# Patient Record
Sex: MALE
[De-identification: names, ages, dates, MRNs, and addresses within clinical notes are randomized per-mention and may not be internally consistent; named-entity substitution may affect disease eponyms.]

---

## 2021-08-03 PROBLEM — Z00.00 ENCOUNTER FOR PREVENTIVE HEALTH EXAMINATION: Status: ACTIVE | Noted: 2021-08-03

## 2021-08-09 ENCOUNTER — APPOINTMENT (OUTPATIENT)
Dept: SURGERY | Facility: CLINIC | Age: 41
End: 2021-08-09
Payer: COMMERCIAL

## 2021-08-09 VITALS
HEIGHT: 75 IN | OXYGEN SATURATION: 99 % | HEART RATE: 64 BPM | DIASTOLIC BLOOD PRESSURE: 86 MMHG | SYSTOLIC BLOOD PRESSURE: 124 MMHG | BODY MASS INDEX: 28.6 KG/M2 | WEIGHT: 230 LBS

## 2021-08-09 VITALS — TEMPERATURE: 96.4 F

## 2021-08-09 DIAGNOSIS — Z78.9 OTHER SPECIFIED HEALTH STATUS: ICD-10-CM

## 2021-08-09 DIAGNOSIS — M62.89 OTHER SPECIFIED DISORDERS OF MUSCLE: ICD-10-CM

## 2021-08-09 DIAGNOSIS — J93.9 PNEUMOTHORAX, UNSPECIFIED: ICD-10-CM

## 2021-08-09 DIAGNOSIS — Z87.891 PERSONAL HISTORY OF NICOTINE DEPENDENCE: ICD-10-CM

## 2021-08-09 PROCEDURE — 99203 OFFICE O/P NEW LOW 30 MIN: CPT

## 2021-08-09 NOTE — PHYSICAL EXAM
[Abdominal Masses] : No abdominal masses [Abdomen Tenderness] : ~T ~M No abdominal tenderness [Alert] : alert [Oriented to Person] : oriented to person [Oriented to Place] : oriented to place [Oriented to Time] : oriented to time [Calm] : calm [de-identified] : He  is alert, well-groomed, and in NAD\par   [de-identified] : anicteric.  Nasal mucosa pink, septum midline. Oral mucosa pink.  Tongue midline, Pharynx without exudates.\par   [de-identified] : Neck supple. Trachea midline. Thyroid isthmus barely palpable, lobes not felt.\par   [de-identified] : no palpable intraabdominal masses

## 2021-08-09 NOTE — HISTORY OF PRESENT ILLNESS
[de-identified] : This is a 41 year  old patient who was referred by Dr. Dex Peterson with the chief complaint of having i abnormal findings on his abdominal MRI. He states that he had  right chest surgery in 2014. Benign. He developed right upper quadrant pain and had an abdominal US that showed  possible fibrosis of the liver. 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    He denies any trauma to the area.   He denies any fever or  night sweats. Appetite is good and weight is stable.

## 2021-08-09 NOTE — PLAN
[FreeTextEntry1] : Mr. FRENCH  was told significance of findings, options, risks and benefits were explained.  IAll surgical options were discussed including non-surgical treatments.   he was advised to return in 6 months and at that time we will order MRI to reassess  the size of the lipoma of the psoas muscle \par Patient advised to seek immediate medical attention with any acute change in symptoms or with the development of any new or worsening symptoms.  Patient's questions and concerns addressed to patient's satisfaction, and patient verbalized an understanding of the information discussed.\par \par

## 2021-08-09 NOTE — CONSULT LETTER
[Dear  ___] : Dear  [unfilled], [Consult Letter:] : I had the pleasure of evaluating your patient, [unfilled]. [Please see my note below.] : Please see my note below. [Consult Closing:] : Thank you very much for allowing me to participate in the care of this patient.  If you have any questions, please do not hesitate to contact me. [Sincerely,] : Sincerely, [FreeTextEntry3] : Randy Cerna MD, FACS

## 2021-11-10 ENCOUNTER — EMERGENCY (EMERGENCY)
Facility: HOSPITAL | Age: 41
LOS: 1 days | Discharge: HOME | End: 2021-11-10
Admitting: EMERGENCY MEDICINE
Payer: OTHER MISCELLANEOUS

## 2021-11-10 VITALS
RESPIRATION RATE: 18 BRPM | TEMPERATURE: 98 F | HEART RATE: 67 BPM | OXYGEN SATURATION: 99 % | SYSTOLIC BLOOD PRESSURE: 122 MMHG | WEIGHT: 175.05 LBS | DIASTOLIC BLOOD PRESSURE: 84 MMHG

## 2021-11-10 VITALS
HEART RATE: 61 BPM | DIASTOLIC BLOOD PRESSURE: 81 MMHG | OXYGEN SATURATION: 97 % | TEMPERATURE: 98 F | SYSTOLIC BLOOD PRESSURE: 133 MMHG | RESPIRATION RATE: 16 BRPM

## 2021-11-10 DIAGNOSIS — S76.311A STRAIN OF MUSCLE, FASCIA AND TENDON OF THE POSTERIOR MUSCLE GROUP AT THIGH LEVEL, RIGHT THIGH, INITIAL ENCOUNTER: ICD-10-CM

## 2021-11-10 DIAGNOSIS — S70.11XA CONTUSION OF RIGHT THIGH, INITIAL ENCOUNTER: ICD-10-CM
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
## 2021-11-10 DIAGNOSIS — M25.462 EFFUSION, LEFT KNEE: ICD-10-CM

## 2021-11-10 DIAGNOSIS — M25.562 PAIN IN LEFT KNEE: ICD-10-CM

## 2021-11-10 DIAGNOSIS — S80.01XA CONTUSION OF RIGHT KNEE, INITIAL ENCOUNTER: ICD-10-CM
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
## 2021-11-10 PROCEDURE — 72170 X-RAY EXAM OF PELVIS: CPT | Mod: 26

## 2021-11-10 PROCEDURE — 73590 X-RAY EXAM OF LOWER LEG: CPT | Mod: 26,LT

## 2021-11-10 PROCEDURE — 73562 X-RAY EXAM OF KNEE 3: CPT | Mod: 26,LT

## 2021-11-10 PROCEDURE — 99284 EMERGENCY DEPT VISIT MOD MDM: CPT | Mod: 25

## 2021-11-10 PROCEDURE — 73564 X-RAY EXAM KNEE 4 OR MORE: CPT | Mod: 26,LT

## 2021-11-10 RX ORDER — KETOROLAC TROMETHAMINE 30 MG/ML
60 SYRINGE (ML) INJECTION ONCE
Refills: 0 | Status: DISCONTINUED | OUTPATIENT
Start: 2021-11-10 | End: 2021-11-10

## 2021-11-10 RX ORDER — OXYCODONE AND ACETAMINOPHEN 5; 325 MG/1; MG/1
1 TABLET ORAL ONCE
Refills: 0 | Status: DISCONTINUED | OUTPATIENT
Start: 2021-11-10 | End: 2021-11-10

## 2021-11-10 RX ADMIN — OXYCODONE AND ACETAMINOPHEN 1 TABLET(S): 5; 325 TABLET ORAL at 21:34

## 2021-11-10 RX ADMIN — Medication 60 MILLIGRAM(S): at 21:34

## 2021-11-10 NOTE — ED PROVIDER NOTE - CHIEF COMPLAINT
The patient is a 41y Male complaining of  The patient is a 41y Male complaining of L knee and R leg pain

## 2021-11-10 NOTE — ED PROVIDER NOTE - CLINICAL SUMMARY MEDICAL DECISION MAKING FREE TEXT BOX
42 yo m pw acute onset of L knee pain aching mod in severity with swelling over the anterior knee and R hamstring pain aching after pt fell off stilts at work with no weakness or numbness. No head or neck trauma, no loc, no ams, no focal deficits. Pt is able to bare weight and ambulate.     +swelling TTP over the L ant prepatellar with bruising, +TTP over the R posterior hamstring, pt able to fully flex and extend the knee b/l with strength 5/5, no c spine ttp -- full ROM flexion; rotation and extension. Full ROM in all joints.    Pt did not want to have acewrap applied and wanted to do it at home, crutches given, and follow up with ortho, pt ambulated well with crutches.

## 2021-11-10 NOTE — ED PROVIDER NOTE - CARE PLAN
1 Principal Discharge DX:	Contusion of knee, right  Secondary Diagnosis:	Effusion of left prepatellar bursa  Secondary Diagnosis:	Hamstring strain

## 2021-11-10 NOTE — ED ADULT NURSE NOTE - OBJECTIVE STATEMENT
41y Male presents w/ left injury from trip and fall at work. Bruising and swelling noted to left knee. ROM intact, pulses intact, sensation intact.

## 2021-11-10 NOTE — ED PROVIDER NOTE - PATIENT PORTAL LINK FT
You can access the FollowMyHealth Patient Portal offered by [facility] by registering at the following website: [email or web address]. By joining [employer]’s FollowMyHealth portal, you will also be able to view your health information using other applications (apps) compatible with our system.

## 2021-11-10 NOTE — ED PROVIDER NOTE - OBJECTIVE STATEMENT
40 yo m pw acute onset of L knee pain aching mod in severity with swelling over the anterior knee and R hamstring pain aching after pt fell off stilts at work with no weakness or numbness. No head or neck trauma, no loc, no ams, no focal deficits. Pt is able to bare weight and ambulate.     I have reviewed available current nursing and previous documentation of past medical, surgical, family, and/or social history.

## 2021-11-10 NOTE — ED PROVIDER NOTE - CARE PROVIDER_API CALL
Anthony Newton)  Orthopaedic Surgery  [address]  [place], [place] [address]  Phone: [phone]  Fax: [phone]  Follow Up Time:

## 2021-11-10 NOTE — ED PROVIDER NOTE - PHYSICAL EXAMINATION
Physical Exam    Vital Signs: I have reviewed the initial vital signs.  Constitutional: well-nourished, appears stated age, no acute distress  Cardiovascular: regular rate, regular rhythm, well-perfused extremities, DP pulse +2 and equal b/l, cap refill <2 sec b/l  Respiratory: unlabored respiratory effort, clear to auscultation bilaterally  Gastrointestinal: soft, non-tender abdomen, no guarding  Musculoskeletal: +swelling TTP over the L ant prepatellar with bruising, +TTP over the R posterior hamstring, pt able to fully flex and extend the knee b/l with strength 5/5, no c spine ttp -- full ROM flexion; rotation and extension. Full ROM in all joints.   Integumentary: warm, dry, no rash  Neurologic: extremities’ motor and sensory functions grossly intact

## 2021-11-10 NOTE — ED PROVIDER NOTE - NSFOLLOWUPINSTRUCTIONS_ED_ALL_ED_FT
Knee Sprain    WHAT YOU NEED TO KNOW:    A knee sprain occurs when one or more ligaments in your knee are suddenly stretched or torn. Ligaments are tissues that hold bones together. Ligaments support the knee and keep the joint and bones in the correct position. Knee Anatomy          DISCHARGE INSTRUCTIONS:    Return to the emergency department if:     Any part of your leg feels cold, numb, or looks pale         Contact your healthcare provider if:     You have new or increased swelling, bruising, or pain in your knee.      Your symptoms do not improve within 6 weeks, even with treatment.      You have questions or concerns about your condition or care.     Medicines:     NSAIDs, such as ibuprofen, help decrease swelling, pain, and fever. This medicine is available with or without a doctor's order. NSAIDs can cause stomach bleeding or kidney problems in certain people. If you take blood thinner medicine, always ask your healthcare provider if NSAIDs are safe for you. Always read the medicine label and follow directions.      Acetaminophen decreases pain and fever. It is available without a doctor's order. Ask how much to take and how often to take it. Follow directions. Read the labels of all other medicines you are using to see if they also contain acetaminophen, or ask your doctor or pharmacist. Acetaminophen can cause liver damage if not taken correctly. Do not use more than 4 grams (4,000 milligrams) total of acetaminophen in one day.       Prescription pain medicine may be given. Ask how to take this medicine safely.       Take your medicine as directed. Contact your healthcare provider if you think your medicine is not helping or if you have side effects. Tell him or her if you are allergic to any medicine. Keep a list of the medicines, vitamins, and herbs you take. Include the amounts, and when and why you take them. Bring the list or the pill bottles to follow-up visits. Carry your medicine list with you in case of an emergency.    Self-care:     Rest your knee and do not exercise. You may be told to keep weight off your knee. This means that you should not walk on your injured leg. Rest helps decrease swelling and allows the injury to heal. You can do gentle range of motion (ROM) exercises as directed. This will prevent stiffness.       Apply ice on your knee for 15 to 20 minutes every hour or as directed. Use an ice pack, or put crushed ice in a plastic bag. Cover it with a towel. Ice helps prevent tissue damage and decreases swelling and pain.      Apply compression to your knee as directed. You may need to wear an elastic bandage. This helps keep your injured knee from moving too much while it heals. You can loosen or tighten the elastic bandage to make it comfortable. It should be tight enough for you to feel support. It should not be so tight that it causes your toes to feel numb or tingly. If you are wearing an elastic bandage, take it off and rewrap it once a day.       Elevate your knee above the level of your heart as often as you can. This will help decrease swelling and pain. Prop your leg on pillows or blankets to keep it elevated comfortably. Do not put pillows directly behind your knee.       Use support devices as directed: Support devices such as a splint or brace may be needed. These devices limit movement and protect your joint while it heals. You may be given crutches to use until you can stand on your injured leg without pain. Use devices as directed.     Physical therapy: A physical therapist teaches you exercises to help improve movement and strength, and to decrease pain.     Prevent another knee sprain: Exercise your legs to keep your muscles strong. Strong leg muscles help protect your knee and prevent strain. The following may also prevent a knee sprain:     Slowly start your exercise or training program. Slowly increase the time, distance, and intensity of your exercise. Sudden increases in training may cause you to injure your knee again.       Wear protective braces and equipment as directed. Braces may prevent your knee from moving the wrong way and causing another sprain. Protective equipment may support your bones and ligaments to prevent injury.       Warm up and stretch before exercise. Warm up by walking or using an exercise bike before starting your regular exercise. Do gentle stretches after warming up. This helps to loosen your muscles and decrease stress on your knee. Cool down and stretch after you exercise.       Wear shoes that fit correctly and support your feet. Replace your running or exercise shoes before the padding or shock absorption is worn out. Ask your healthcare provider which exercise shoes are best for you. Ask if you should wear special shoe inserts. Shoe inserts can help support your heels and arches or keep your foot lined up correctly in your shoes. Exercise on flat surfaces.    Follow up with your healthcare provider as directed: Write down your questions so you remember to ask them during your visits.        © Copyright [employer] 2019 All illustrations and images included in CareNotes are the copyrighted property of [email or web address] or [employer].

## 2021-11-10 NOTE — ED ADULT NURSE REASSESSMENT NOTE - NS ED NURSE REASSESS COMMENT FT1
Pt received from previous shift RN A&OX3, spon resps on Ra unlabored, NAD. Swelling and bruising noted to left knee. After injury pt was able to ambulate on injured leg. Ice pack applied, pending pain medications.
